# Patient Record
Sex: MALE | Race: ASIAN | NOT HISPANIC OR LATINO | URBAN - METROPOLITAN AREA
[De-identification: names, ages, dates, MRNs, and addresses within clinical notes are randomized per-mention and may not be internally consistent; named-entity substitution may affect disease eponyms.]

---

## 2023-01-25 ENCOUNTER — EMERGENCY (EMERGENCY)
Facility: HOSPITAL | Age: 48
LOS: 1 days | Discharge: HOME | End: 2023-01-25
Admitting: EMERGENCY MEDICINE
Payer: MEDICAID

## 2023-01-25 VITALS
OXYGEN SATURATION: 99 % | DIASTOLIC BLOOD PRESSURE: 88 MMHG | HEART RATE: 94 BPM | RESPIRATION RATE: 16 BRPM | SYSTOLIC BLOOD PRESSURE: 126 MMHG | TEMPERATURE: 99 F

## 2023-01-25 VITALS
OXYGEN SATURATION: 100 % | DIASTOLIC BLOOD PRESSURE: 77 MMHG | RESPIRATION RATE: 16 BRPM | HEART RATE: 87 BPM | SYSTOLIC BLOOD PRESSURE: 119 MMHG

## 2023-01-25 PROCEDURE — 99284 EMERGENCY DEPT VISIT MOD MDM: CPT

## 2023-01-25 PROCEDURE — 71046 X-RAY EXAM CHEST 2 VIEWS: CPT | Mod: 26

## 2023-01-25 RX ORDER — IPRATROPIUM/ALBUTEROL SULFATE 18-103MCG
3 AEROSOL WITH ADAPTER (GRAM) INHALATION ONCE
Refills: 0 | Status: COMPLETED | OUTPATIENT
Start: 2023-01-25 | End: 2023-01-25

## 2023-01-25 RX ADMIN — Medication 3 MILLILITER(S): at 14:33

## 2023-01-25 RX ADMIN — Medication 50 MILLIGRAM(S): at 14:31

## 2023-01-25 NOTE — ED PROVIDER NOTE - CLINICAL SUMMARY MEDICAL DECISION MAKING FREE TEXT BOX
47-year-old male no past medical history presents to ED complaining of persistent cough x2 months.  Patient states cough started around November, then was tested positive in January for COVID which since he has had 2 negative COVID tests.  Has taken Tessalon Perles and allergy medication without any improvement.  Patient states has noticed every year around change of seasons develops this cough.  Denies any fever, SOB, N/V/D, night sweats, weight loss.  Is a former smoker.  On exam VSS, o2 sat 99% on RA, lungs clear.  CXR done which was negative.  Likely bronchitis/ reactive airway disease, duonebs and prednisone given in ED.  ROSA MARIA beck assisting with pulmonology follow up.

## 2023-01-25 NOTE — ED ADULT TRIAGE NOTE - CHIEF COMPLAINT QUOTE
Pt c/o nonproductive cough worse at night after testing Covid+ January 4th. Seen by PCP and prescribed allergy medications. Denies CP, SOB.

## 2023-01-25 NOTE — ED ADULT NURSE NOTE - OBJECTIVE STATEMENT
Pt received in intake room 13 A&Ox4, ambulatory at baseline. Pt is a 48 y/o M presents to ED c/o cough x 2 months. Pt reports his cough started around November and was diagnosed with COVID on January 4. Pt states he has taking allergy medication with minimal improvement of symptoms. Denies sob, abdominal pain, fever, chills. Respirations are even and unlabored, airway patent, skin intact, VSS. Pt medicated as per MAR. Awaiting further orders

## 2023-01-25 NOTE — ED PROVIDER NOTE - OBJECTIVE STATEMENT
47-year-old male no past medical history presents to ED complaining of persistent cough x2 months.  Patient states cough started around November, then was tested positive in January for COVID which since he has had 2 negative COVID tests.  Has taken Tessalon Perles and allergy medication without any improvement.  Patient states has noticed every year around change of seasons develops this cough.  Denies any fever, SOB, N/V/D, night sweats, weight loss.  Is a former smoker.

## 2023-01-25 NOTE — ED PROVIDER NOTE - NSFOLLOWUPINSTRUCTIONS_ED_ALL_ED_FT
Take prednisone 40 mg once a day x4 days.  Use Flonase 1 spray each nostril twice a day.  Drink plenty of fluids.  Follow-up with your pulmonologist within 1 to 2 weeks.  Return to ER for any difficulty breathing, chest pain, fever or any other concerning symptoms.

## 2023-01-25 NOTE — ED PROVIDER NOTE - PATIENT PORTAL LINK FT
You can access the FollowMyHealth Patient Portal offered by [facility] by registering at the following website: [email or web address]. By joining [email or web address]’s FollowMyHealth portal, you will also be able to view your health information using other applications (apps) compatible with our system.

## 2023-01-26 PROBLEM — Z78.9 OTHER SPECIFIED HEALTH STATUS: Chronic | Status: ACTIVE | Noted: 2023-01-25

## 2023-01-26 PROBLEM — Z00.00 ENCOUNTER FOR PREVENTIVE HEALTH EXAMINATION: Status: ACTIVE | Noted: 2023-01-26

## 2023-01-30 ENCOUNTER — APPOINTMENT (OUTPATIENT)
Dept: PULMONOLOGY | Facility: CLINIC | Age: 48
End: 2023-01-30
Payer: MEDICAID

## 2023-01-30 VITALS
TEMPERATURE: 98 F | HEART RATE: 80 BPM | DIASTOLIC BLOOD PRESSURE: 70 MMHG | OXYGEN SATURATION: 98 % | SYSTOLIC BLOOD PRESSURE: 110 MMHG | RESPIRATION RATE: 16 BRPM

## 2023-01-30 DIAGNOSIS — Z87.891 PERSONAL HISTORY OF NICOTINE DEPENDENCE: ICD-10-CM

## 2023-01-30 PROCEDURE — 99203 OFFICE O/P NEW LOW 30 MIN: CPT | Mod: 1L

## 2023-01-30 PROCEDURE — XXXXX: CPT | Mod: 1L

## 2023-01-30 RX ORDER — ALBUTEROL SULFATE 90 UG/1
108 (90 BASE) INHALANT RESPIRATORY (INHALATION)
Qty: 1 | Refills: 3 | Status: ACTIVE | COMMUNITY
Start: 2023-01-30 | End: 1900-01-01

## 2023-02-15 ENCOUNTER — APPOINTMENT (OUTPATIENT)
Dept: PULMONOLOGY | Facility: CLINIC | Age: 48
End: 2023-02-15
Payer: MEDICAID

## 2023-02-15 VITALS
HEIGHT: 66 IN | DIASTOLIC BLOOD PRESSURE: 80 MMHG | TEMPERATURE: 98 F | HEART RATE: 10 BPM | SYSTOLIC BLOOD PRESSURE: 113 MMHG | OXYGEN SATURATION: 97 % | WEIGHT: 130 LBS | BODY MASS INDEX: 20.89 KG/M2

## 2023-02-15 DIAGNOSIS — J06.9 ACUTE UPPER RESPIRATORY INFECTION, UNSPECIFIED: ICD-10-CM

## 2023-02-15 DIAGNOSIS — R05.9 COUGH, UNSPECIFIED: ICD-10-CM

## 2023-02-15 DIAGNOSIS — J45.909 UNSPECIFIED ASTHMA, UNCOMPLICATED: ICD-10-CM

## 2023-02-15 PROBLEM — Z87.891 FORMER SMOKER: Status: ACTIVE | Noted: 2023-02-15

## 2023-02-15 PROCEDURE — 94729 DIFFUSING CAPACITY: CPT

## 2023-02-15 PROCEDURE — 99203 OFFICE O/P NEW LOW 30 MIN: CPT | Mod: 25

## 2023-02-15 PROCEDURE — 94060 EVALUATION OF WHEEZING: CPT

## 2023-02-15 PROCEDURE — 94727 GAS DIL/WSHOT DETER LNG VOL: CPT

## 2023-02-15 RX ORDER — MONTELUKAST 10 MG/1
10 TABLET, FILM COATED ORAL
Qty: 90 | Refills: 1 | Status: ACTIVE | COMMUNITY
Start: 2023-02-15 | End: 1900-01-01

## 2023-02-15 RX ORDER — FLUTICASONE FUROATE, UMECLIDINIUM BROMIDE AND VILANTEROL TRIFENATATE 200; 62.5; 25 UG/1; UG/1; UG/1
200-62.5-25 POWDER RESPIRATORY (INHALATION)
Qty: 1 | Refills: 3 | Status: ACTIVE | COMMUNITY
Start: 2023-02-15 | End: 1900-01-01

## 2023-02-15 RX ORDER — MONTELUKAST 10 MG/1
10 TABLET, FILM COATED ORAL
Refills: 0 | Status: ACTIVE | COMMUNITY
Start: 2023-02-15

## 2023-02-15 RX ORDER — ALBUTEROL SULFATE 90 UG/1
108 (90 BASE) INHALANT RESPIRATORY (INHALATION)
Qty: 1 | Refills: 3 | Status: ACTIVE | COMMUNITY
Start: 2023-02-15 | End: 1900-01-01

## 2023-02-15 NOTE — HISTORY OF PRESENT ILLNESS
[Former] : former [< 20 pack-years] : < 20 pack-years [TextBox_4] : 48 yo male with hx of cough, presents for follow up. The patient  feels "better" after two week treatment of trelegy with daily montelukast and rare albuterol MDI use. He denies fever, chest pain or hemoptysis. [TextBox_29] : Denies snoring, daytime somnolence, apneic episodes, AM headaches

## 2023-02-15 NOTE — DISCUSSION/SUMMARY
[FreeTextEntry1] : 48 yo male with complaints consistent with hyperreactive airways disease. I reviewed the PFT results with the patient. He was again given a two week sample of trelegy. He was to take montelukast daily with PRN albuterol MDI.

## 2023-02-15 NOTE — HISTORY OF PRESENT ILLNESS
[Former] : former [< 20 pack-years] : < 20 pack-years [TextBox_4] : 46 yo male presents for evaluation of cough which started over two months ago. He was initially seen in the ER at [facility] treated with po steroids and montelukast. Chest xray was negative. He was covid 19 positive early January, treated with paxlovid. Presently he denies fever, chest pain or hemoptysis.He has a less than 20 pack year history of smoking, having quit 10 years ago. [TextBox_29] : Denies snoring, daytime somnolence, apneic episodes, AM headaches

## 2023-02-15 NOTE — DISCUSSION/SUMMARY
[FreeTextEntry1] : 48 yo male with post infectious/ inflammatory cough. I reviewed the chest xray images on line. He was given a two week sample of trelegy 100 with continued use of montelukast daily and PRN albuterol MDI. PFT will be performed in the future.

## 2023-05-15 ENCOUNTER — APPOINTMENT (OUTPATIENT)
Dept: PULMONOLOGY | Facility: CLINIC | Age: 48
End: 2023-05-15

## 2025-03-07 ENCOUNTER — EMERGENCY (EMERGENCY)
Facility: HOSPITAL | Age: 50
LOS: 1 days | Discharge: HOME | End: 2025-03-07
Attending: STUDENT IN AN ORGANIZED HEALTH CARE EDUCATION/TRAINING PROGRAM | Admitting: STUDENT IN AN ORGANIZED HEALTH CARE EDUCATION/TRAINING PROGRAM
Payer: MEDICAID

## 2025-03-07 VITALS
DIASTOLIC BLOOD PRESSURE: 83 MMHG | WEIGHT: 134.92 LBS | OXYGEN SATURATION: 96 % | HEIGHT: 66 IN | HEART RATE: 93 BPM | RESPIRATION RATE: 20 BRPM | TEMPERATURE: 99 F | SYSTOLIC BLOOD PRESSURE: 124 MMHG

## 2025-03-07 LAB
FLUAV AG NPH QL: SIGNIFICANT CHANGE UP
FLUBV AG NPH QL: SIGNIFICANT CHANGE UP
RSV RNA NPH QL NAA+NON-PROBE: SIGNIFICANT CHANGE UP
SARS-COV-2 RNA SPEC QL NAA+PROBE: SIGNIFICANT CHANGE UP

## 2025-03-07 PROCEDURE — 99284 EMERGENCY DEPT VISIT MOD MDM: CPT

## 2025-03-07 PROCEDURE — 71046 X-RAY EXAM CHEST 2 VIEWS: CPT | Mod: 26

## 2025-03-07 PROCEDURE — 93010 ELECTROCARDIOGRAM REPORT: CPT

## 2025-03-07 RX ORDER — BENZONATATE 100 MG
100 CAPSULE ORAL ONCE
Refills: 0 | Status: COMPLETED | OUTPATIENT
Start: 2025-03-07 | End: 2025-03-07

## 2025-03-07 RX ORDER — BENZONATATE 100 MG
1 CAPSULE ORAL
Qty: 15 | Refills: 0
Start: 2025-03-07 | End: 2025-03-11

## 2025-03-07 RX ADMIN — Medication 10 MILLIGRAM(S): at 15:39

## 2025-03-07 RX ADMIN — Medication 100 MILLIGRAM(S): at 15:39

## 2025-03-07 NOTE — ED PROVIDER NOTE - CLINICAL SUMMARY MEDICAL DECISION MAKING FREE TEXT BOX
Samra GARCIA:   Exam vital signs are stable nontoxic-appearing with physical exam as above DDx concern for likely cough in setting of post viral syndrome, possibly early bronchitis, he is not having any hemoptysis, no lower extremity swelling, presentation not appear consistent with that of PE, his lungs are clear, vitals are reassuring, presentation not appear consistent with that of ACS is not having any exertional chest pain or shortness of breath but only has symptoms in setting of repeat bouts of cough, he also reports he denies a mild nasal congestion intermittent taking Flonase, consider possible allergic component, given his presentation we will give symptomatic treatment with Tessalon Perles as well as recommend patient attempt to try Zyrtec instead of loratadine, get screening chest x-ray and EKG, reassess, if  studies are nonactionable patient may be likely discharged to follow-up with his PMD.

## 2025-03-07 NOTE — ED PROVIDER NOTE - OBJECTIVE STATEMENT
Samra GARCIA: 49-year-old male, no reported medical history, presents with a chief complaint of persisting cough, patient reports a few weeks ago he was diagnosed with flu, at that time he had associated fevers that have subsequently resolved and associated diarrhea that is subsequently resolved, however he still having a persistent productive cough of whitish sputum, no blood, has intermittent chest discomfort only in setting of repeat bouts of cough, shortness of breath only in setting of repeated bouts of cough and mild lower abdominal pain only in setting of a bouts of cough, no urinary bowel symptoms, reports is difficult to work secondary to his cough as he works as a [employer] , has follow-up with primary care doctor for this, was started on loratadine as well as Symbicort without improvement. No prior  hx of DVT/PE. No hemoptysis.

## 2025-03-07 NOTE — ED PROVIDER NOTE - NSFOLLOWUPINSTRUCTIONS_ED_ALL_ED_FT
Thank you for visiting our Emergency Department, it has been a pleasure taking part in your healthcare.  Please read and follow all of your discharge instructions. These instructions contain important information regarding your Emergency Department visit and future medical care.     Your discharge diagnosis is: cough  Please take all discharge medications as indicated below:  Take Motrin/Tylenol for pain as needed, please follow instructions on manufacturers label. If you have any questions please consult a pharmacist or your PMD.  Stop Loratadine, Start Zyrtec 10mg once a day   Take Benzonatat 100mg every 8 hours as needed for cough  Please follow up with your PMD for further eval.  A copy of resulted labs, imaging, and findings have been provided to you.   You can also access all of your results through the [facility].  If you have questions about your results, please call the Emergency Department.  During your visit and at time of discharge, you had a detailed discussion with your provider regarding your diagnosis, care management and discharge planning.  Topics that were discussed included but were not limited to: return precautions, follow up visits with existing or new providers, new prescriptions and/or medication changes, wound and/or splint/cast care, incidental laboratory/radiology findings, or other care   aspects specific to your diagnosis and treatment. You have been given the opportunity to have your questions answered. At this time you have been deemed stable and fit for discharge.  Return precautions to the Emergency Department include but are not limited to: unrelenting nausea, vomiting, fever, chills, chest pain, shortness of breath, dizziness, chest or abdominal pain, worsening back pain, syncope, blood in urine or stool, headache that doesn't resolve, numbness or tingling, loss of sensation, loss of motor function, or any other concerning symptoms.    Please bring all ED Documents you were given during your stay to your PMD.   They contain important information for you and your PMD, including incidental lab/radiology findings that your PMD should be aware of.

## 2025-03-07 NOTE — ED PROVIDER NOTE - PATIENT PORTAL LINK FT
You can access the FollowMyHealth Patient Portal offered by [facility] by registering at the following website: [email or web address]. By joining [employer]’s FollowMyHealth portal, you will also be able to view your health information using other applications (apps) compatible with our system.

## 2025-03-07 NOTE — ED PROVIDER NOTE - PHYSICAL EXAMINATION
Samra GARCIA:  VITALS: Initial triage and subsequent vitals have been reviewed by me.  GEN APPEARANCE: Alert, cooperative. Non-toxic appearing. Well appearing. NAD.  HEAD: Atraumatic, normocephalic   EYES: PERRLa, EOMI, vision grossly intact.   EARS: Gross hearing intact.   NOSE: No nasal discharge, no external evidence of epistaxis.   NECK: Supple  CV: RRR, S1S2, no c/r/m/g. No cyanosis. Extremities warm, well perfused. Cap refill <2 seconds. No bruits.   LUNGS: CTAB. No wheezing. No rales. No rhonchi. No diminished breath sounds.   ABDOMEN: Soft, NTND. No guarding or rebound. No masses.   MSK/EXT: Spine appears normal, no spine point tenderness. No CVA ttp. Normal muscular development. Pelvis stable. No obvious joint or bony deformity, no peripheral edema.   NEURO: Alert, follows commands. Weight bearing normal. Speech normal. Sensation and motor normal x4 extremities.   SKIN: Normal color for race, warm, dry and intact. No evidence of rash.  PSYCH: Normal mood and affect.

## 2025-03-07 NOTE — ED ADULT TRIAGE NOTE - CHIEF COMPLAINT QUOTE
patient states " I am having cough for a month and seen by primary treated with Symbicort/ Loratadine  with no relief. denies chest pain/SOB states he had  flu in february and is been coughing since then and unable to go back to work.